# Patient Record
Sex: FEMALE | Race: WHITE | Employment: OTHER | URBAN - METROPOLITAN AREA
[De-identification: names, ages, dates, MRNs, and addresses within clinical notes are randomized per-mention and may not be internally consistent; named-entity substitution may affect disease eponyms.]

---

## 2017-01-31 ENCOUNTER — HOSPITAL ENCOUNTER (OUTPATIENT)
Dept: MAMMOGRAPHY | Age: 72
Discharge: HOME | End: 2017-01-31
Payer: MEDICARE

## 2017-01-31 DIAGNOSIS — Z12.31 VISIT FOR SCREENING MAMMOGRAM: ICD-10-CM

## 2017-01-31 PROCEDURE — G0202 SCR MAMMO BI INCL CAD: HCPCS

## 2017-10-16 ENCOUNTER — HOSPITAL ENCOUNTER (OUTPATIENT)
Dept: ULTRASOUND IMAGING | Age: 72
Discharge: HOME | End: 2017-10-16
Attending: FAMILY MEDICINE
Payer: MEDICARE

## 2017-10-16 DIAGNOSIS — E04.1 THYROID NODULE: ICD-10-CM

## 2017-10-16 PROCEDURE — 76536 US EXAM OF HEAD AND NECK: CPT

## 2017-10-20 ENCOUNTER — HOSPITAL ENCOUNTER (OUTPATIENT)
Dept: ULTRASOUND IMAGING | Age: 72
Discharge: HOME | End: 2017-10-20
Attending: FAMILY MEDICINE
Payer: MEDICARE

## 2017-10-20 DIAGNOSIS — E04.1 RIGHT THYROID NODULE: ICD-10-CM

## 2017-10-20 PROCEDURE — 88173 CYTOPATH EVAL FNA REPORT: CPT | Performed by: FAMILY MEDICINE

## 2017-10-20 PROCEDURE — 74011000250 HC RX REV CODE- 250: Performed by: RADIOLOGY

## 2017-10-20 PROCEDURE — 10022 US GUIDE FINE NDL ASP W IMAGE: CPT

## 2017-10-20 PROCEDURE — 88172 CYTP DX EVAL FNA 1ST EA SITE: CPT | Performed by: FAMILY MEDICINE

## 2017-10-20 RX ORDER — LIDOCAINE HYDROCHLORIDE 10 MG/ML
4 INJECTION, SOLUTION EPIDURAL; INFILTRATION; INTRACAUDAL; PERINEURAL
Status: COMPLETED | OUTPATIENT
Start: 2017-10-20 | End: 2017-10-20

## 2017-10-20 RX ADMIN — LIDOCAINE HYDROCHLORIDE 1 ML: 10 INJECTION, SOLUTION EPIDURAL; INFILTRATION; INTRACAUDAL; PERINEURAL at 16:00

## 2017-10-20 NOTE — DISCHARGE INSTRUCTIONS
[facility]  Department of Radiology  [phone] Ultrasound Department  [phone] Emergency Department    BIOPSY DISCHARGE INSTRUCTIONS for Rosalio Shaw  on 10/20/17    General Instructions:  - A biopsy is the removal of a small piece of tissue for microscopic examination or testing.  - Healthy tissue can be obtained for the purpose of tissue-type matching for transplants. - Unhealthy tissues are more commonly biopsied to diagnose disease. General Biopsy:  - A mass can grow in any area of the body, and we are taking a specimen as ordered by your doctor. - The risks are the same. They include bleeding, pain, and infection. Home Care Instructions:  - You may resume your regular diet and medication regimen. - You may use over the counter acetaminophen (Tylenol) or ibuprofen (Advil) for the soreness. - You may apply an ice pack to the affected area for 20-30 minutes at time for the first 24 hours. -- After that, you may apply a heat pack. - You may remove the bandaid(s) tonight. - You may take a shower tonight. - Please keep the site clean and dry for 24-48 hours. - Do not soak in any kind of water (bath tub, hot tub, pool, ocean, etc) for 24-48 hours. - Do not participate in any kind of activity making you vigorously sweat for 24-48 hours. - Do not use any moisturizer or makeup on the site for 24-48 hours. Call If:  - You should call your doctor if you have any questions or concerns about the biopsy site. - Call if you should have increased pain, fever, redness, drainage, or bleeding more than a small spot on the bandage.      Follow-Up Instructions:  - Please see your doctor as he has requested.        ______________________________                                                   ______________________________  Juliette Kwong

## 2018-02-02 ENCOUNTER — HOSPITAL ENCOUNTER (OUTPATIENT)
Dept: MAMMOGRAPHY | Age: 73
Discharge: HOME | End: 2018-02-02
Payer: MEDICARE

## 2018-02-02 DIAGNOSIS — Z12.31 VISIT FOR SCREENING MAMMOGRAM: ICD-10-CM

## 2018-02-02 PROCEDURE — 77067 SCR MAMMO BI INCL CAD: CPT

## 2019-02-04 ENCOUNTER — HOSPITAL ENCOUNTER (OUTPATIENT)
Dept: MAMMOGRAPHY | Age: 74
Discharge: HOME | End: 2019-02-04
Payer: MEDICARE

## 2019-02-04 DIAGNOSIS — M85.89 OTHER SPECIFIED DISORDERS OF BONE DENSITY AND STRUCTURE, MULTIPLE SITES: ICD-10-CM

## 2019-02-04 DIAGNOSIS — Z12.39 SCREENING BREAST EXAMINATION: ICD-10-CM

## 2019-02-04 PROCEDURE — 77080 DXA BONE DENSITY AXIAL: CPT

## 2019-02-04 PROCEDURE — 77067 SCR MAMMO BI INCL CAD: CPT

## 2020-02-05 ENCOUNTER — HOSPITAL ENCOUNTER (OUTPATIENT)
Dept: MAMMOGRAPHY | Age: 75
Discharge: HOME | End: 2020-02-05
Attending: FAMILY MEDICINE
Payer: MEDICARE

## 2020-02-05 DIAGNOSIS — Z12.31 VISIT FOR SCREENING MAMMOGRAM: ICD-10-CM

## 2020-02-05 PROCEDURE — 77063 BREAST TOMOSYNTHESIS BI: CPT

## 2021-01-18 ENCOUNTER — TRANSCRIBE ORDER (OUTPATIENT)
Dept: SCHEDULING | Age: 76
End: 2021-01-18

## 2021-01-18 DIAGNOSIS — Z12.31 SCREENING MAMMOGRAM FOR HIGH-RISK PATIENT: Primary | ICD-10-CM

## 2021-01-25 ENCOUNTER — TRANSCRIBE ORDER (OUTPATIENT)
Dept: SCHEDULING | Age: 76
End: 2021-01-25

## 2021-01-25 DIAGNOSIS — M81.0 AGE-RELATED OSTEOPOROSIS WITHOUT CURRENT PATHOLOGICAL FRACTURE: Primary | ICD-10-CM

## 2021-01-28 ENCOUNTER — IMMUNIZATION (OUTPATIENT)
Dept: INTERNAL MEDICINE CLINIC | Age: 76
End: 2021-01-28
Payer: MEDICARE

## 2021-01-28 DIAGNOSIS — Z23 ENCOUNTER FOR IMMUNIZATION: Primary | ICD-10-CM

## 2021-01-28 PROCEDURE — 0011A PR IMM ADMN SARSCOV2 100 MCG/0.5 ML 1ST DOSE: CPT | Performed by: FAMILY MEDICINE

## 2021-01-28 PROCEDURE — 91301 COVID-19, MRNA, LNP-S, PF, 100MCG/0.5ML DOSE(MODERNA): CPT | Performed by: FAMILY MEDICINE

## 2021-02-11 ENCOUNTER — HOSPITAL ENCOUNTER (OUTPATIENT)
Dept: MAMMOGRAPHY | Age: 76
Discharge: HOME | End: 2021-02-11
Attending: FAMILY MEDICINE
Payer: MEDICARE

## 2021-02-11 DIAGNOSIS — Z12.31 SCREENING MAMMOGRAM FOR HIGH-RISK PATIENT: ICD-10-CM

## 2021-02-11 PROCEDURE — 77063 BREAST TOMOSYNTHESIS BI: CPT

## 2021-02-25 ENCOUNTER — IMMUNIZATION (OUTPATIENT)
Dept: INTERNAL MEDICINE CLINIC | Age: 76
End: 2021-02-25
Payer: MEDICARE

## 2021-02-25 DIAGNOSIS — Z23 ENCOUNTER FOR IMMUNIZATION: Primary | ICD-10-CM

## 2021-02-25 PROCEDURE — 0012A PR IMM ADMN SARSCOV2 100 MCG/0.5 ML 2ND DOSE: CPT | Performed by: FAMILY MEDICINE

## 2021-02-25 PROCEDURE — 91301 COVID-19, MRNA, LNP-S, PF, 100MCG/0.5ML DOSE(MODERNA): CPT | Performed by: FAMILY MEDICINE

## 2021-05-06 ENCOUNTER — HOSPITAL ENCOUNTER (OUTPATIENT)
Dept: MAMMOGRAPHY | Age: 76
Discharge: HOME | End: 2021-05-06
Attending: INTERNAL MEDICINE
Payer: MEDICARE

## 2021-05-06 DIAGNOSIS — M81.0 AGE-RELATED OSTEOPOROSIS WITHOUT CURRENT PATHOLOGICAL FRACTURE: ICD-10-CM

## 2021-05-06 PROCEDURE — 77080 DXA BONE DENSITY AXIAL: CPT

## 2022-01-17 ENCOUNTER — TRANSCRIBE ORDER (OUTPATIENT)
Dept: SCHEDULING | Age: 77
End: 2022-01-17

## 2022-01-17 DIAGNOSIS — Z12.31 VISIT FOR SCREENING MAMMOGRAM: Primary | ICD-10-CM

## 2022-02-14 ENCOUNTER — HOSPITAL ENCOUNTER (OUTPATIENT)
Dept: MAMMOGRAPHY | Age: 77
Discharge: HOME | End: 2022-02-14
Attending: FAMILY MEDICINE
Payer: MEDICARE

## 2022-02-14 DIAGNOSIS — Z12.31 VISIT FOR SCREENING MAMMOGRAM: ICD-10-CM

## 2022-02-14 PROCEDURE — 77063 BREAST TOMOSYNTHESIS BI: CPT

## 2022-11-17 ENCOUNTER — TRANSCRIBE ORDER (OUTPATIENT)
Dept: SCHEDULING | Age: 77
End: 2022-11-17

## 2022-11-17 DIAGNOSIS — M81.0 AGE-RELATED OSTEOPOROSIS WITHOUT CURRENT PATHOLOGICAL FRACTURE: Primary | ICD-10-CM

## 2023-01-23 ENCOUNTER — TRANSCRIBE ORDER (OUTPATIENT)
Dept: SCHEDULING | Age: 78
End: 2023-01-23

## 2023-01-23 DIAGNOSIS — Z12.31 VISIT FOR SCREENING MAMMOGRAM: Primary | ICD-10-CM

## 2023-02-15 ENCOUNTER — HOSPITAL ENCOUNTER (OUTPATIENT)
Dept: MAMMOGRAPHY | Age: 78
Discharge: HOME | End: 2023-02-15
Attending: FAMILY MEDICINE
Payer: MEDICARE

## 2023-02-15 DIAGNOSIS — Z12.31 VISIT FOR SCREENING MAMMOGRAM: ICD-10-CM

## 2023-02-15 PROCEDURE — 77063 BREAST TOMOSYNTHESIS BI: CPT

## 2023-04-22 DIAGNOSIS — M81.0 AGE-RELATED OSTEOPOROSIS WITHOUT CURRENT PATHOLOGICAL FRACTURE: Primary | ICD-10-CM

## 2023-05-09 ENCOUNTER — TRANSCRIBE ORDERS (OUTPATIENT)
Facility: HOSPITAL | Age: 78
End: 2023-05-09

## 2023-05-09 DIAGNOSIS — M81.0 AGE-RELATED OSTEOPOROSIS WITHOUT CURRENT PATHOLOGICAL FRACTURE: Primary | ICD-10-CM

## 2023-05-10 ENCOUNTER — HOSPITAL ENCOUNTER (OUTPATIENT)
Facility: HOSPITAL | Age: 78
Discharge: HOME | End: 2023-05-13
Payer: MEDICARE

## 2023-05-10 DIAGNOSIS — M81.0 AGE-RELATED OSTEOPOROSIS WITHOUT CURRENT PATHOLOGICAL FRACTURE: ICD-10-CM

## 2023-05-10 PROCEDURE — 77080 DXA BONE DENSITY AXIAL: CPT

## 2024-01-11 ENCOUNTER — TRANSCRIBE ORDERS (OUTPATIENT)
Facility: HOSPITAL | Age: 79
End: 2024-01-11

## 2024-01-11 DIAGNOSIS — Z12.31 VISIT FOR SCREENING MAMMOGRAM: Primary | ICD-10-CM

## 2024-02-19 ENCOUNTER — HOSPITAL ENCOUNTER (OUTPATIENT)
Age: 79
Discharge: HOME | End: 2024-02-22
Payer: MEDICARE

## 2024-02-19 VITALS — WEIGHT: 118 LBS | HEIGHT: 61 IN | BODY MASS INDEX: 22.28 KG/M2

## 2024-02-19 DIAGNOSIS — Z12.31 VISIT FOR SCREENING MAMMOGRAM: ICD-10-CM

## 2024-02-19 PROCEDURE — 77063 BREAST TOMOSYNTHESIS BI: CPT

## 2024-02-20 ENCOUNTER — TRANSCRIBE ORDERS (OUTPATIENT)
Facility: HOSPITAL | Age: 79
End: 2024-02-20

## 2024-02-20 DIAGNOSIS — Z12.31 BREAST CANCER SCREENING BY MAMMOGRAM: Primary | ICD-10-CM

## 2025-02-20 ENCOUNTER — HOSPITAL ENCOUNTER (OUTPATIENT)
Age: 80
Discharge: HOME | End: 2025-02-23
Payer: MEDICARE

## 2025-02-20 VITALS — BODY MASS INDEX: 21.14 KG/M2 | WEIGHT: 112 LBS | HEIGHT: 61 IN

## 2025-02-20 DIAGNOSIS — Z12.31 BREAST CANCER SCREENING BY MAMMOGRAM: ICD-10-CM

## 2025-02-20 PROCEDURE — 77063 BREAST TOMOSYNTHESIS BI: CPT

## 2025-02-21 ENCOUNTER — TRANSCRIBE ORDERS (OUTPATIENT)
Facility: HOSPITAL | Age: 80
End: 2025-02-21

## 2025-02-21 DIAGNOSIS — Z12.31 OTHER SCREENING MAMMOGRAM: Primary | ICD-10-CM

## 2025-05-12 ENCOUNTER — HOSPITAL ENCOUNTER (OUTPATIENT)
Facility: HOSPITAL | Age: 80
Discharge: HOME | End: 2025-05-15
Payer: MEDICARE

## 2025-05-12 DIAGNOSIS — Z78.0 MENOPAUSE: ICD-10-CM

## 2025-05-12 PROCEDURE — 77080 DXA BONE DENSITY AXIAL: CPT
